# Patient Record
(demographics unavailable — no encounter records)

---

## 2024-12-12 NOTE — HISTORY OF PRESENT ILLNESS
[de-identified] : 68 yo F pmhx breast ca (s/p L- mastectomy, chemo and Tamoxifen course), HLD, osteopenia, chronic LBP, rosacea, glaucoma suspect seen today for CPE   spouse  24 -  in sleep of MI he was 68  -have 2 kids - supportive - she helps watch her grand son 2 x a week   increased co q 10 200 qd - no cramps since  does weights pilaties steps etc  retired 2021 caring for her Grand son and  mother 90 yr old, has mobility issues and lives alone   ball of foot gets numb when driving - concerned   24 will be getting left wrist cyst removed   Migraines/ headache - none since last visit    was in ER they has a Stroke code r/o stroke. 2023  DID CAT Angio Neck , head 23 -neg  MRI Brain - Ch infarct vs perivascular space in rt basal ganglia , no acute infarct  23 ERI echocardiogram.-showed PAVM -was advised to see cardio and  neurologist for an EEG. started 81 mg of aspirin and Lipitor 40 mg . she started Asa but did not start lipitor 40 due to poor muscle - could not tolerate medications in past  -saw cardio 10/9/23 did Holter Monitor -normal - on asa and atorvastatin  -saw neuro 23 - did EEG carotid and intracranial Doppler's neg   HX herniated disc in past 9 yrs ago -hurt her back and right thigh at gym doing certain exercises -9 yrs ago - recently she started to get cramps and pain right thigh and groin - slowly getting worse , now concerned as her driving is affected, groin pain when she reaches for break and her thigh cramps up - pain in hamstring sitting in chair - tingling in legs after long sitting or stanging, no numbness for last 3 months - numbness , no Cp, SOB , palpitation , no light headed or dizzy spells  hx word mixing, stress- states overall happening less, but still on-going and is more aware of it -reports stable stress level related to caring for elderly mother, helps that is now retired and doing Pilates -denies depression, anxiety, HI or SI -interested to see specialist -denies HA, vision trouble, dizziness, dysarthria, imblanace, falls or focal weakness  hx breast ca - left, dx'd 8/01, hx L- mastectomy 10/01, s/p chemo (x 6 mo ), s/p Tamoxifen (-)- in remission -hx BRCA negative per pt -followed by oncology- seen 2021 , advised genetics eval- pending. To f/u under survivorship - pending -reports mammo/sono 2022 -followed by breast surgeon, Dr. Siegle  hx osteopenia- hx DEXA 10/21 osteopenia -on Ca/D -exercising -denies hx bone fractures or falls.  HLD- no hx medications -has low fat diet -exercising: Pilates 3-4x/wk x 1 yr; walks 10k steps/d - done w/o sx's or limitations  hx rosacea- controlled on topical per derm, seen yearly

## 2024-12-12 NOTE — HEALTH RISK ASSESSMENT
[Good] : ~his/her~  mood as  good [No] : In the past 12 months have you used drugs other than those required for medical reasons? No [No falls in past year] : Patient reported no falls in the past year [0] : 2) Feeling down, depressed, or hopeless: Not at all (0) [PHQ-2 Negative - No further assessment needed] : PHQ-2 Negative - No further assessment needed [Never] : Never [Alone] : lives alone [Retired] : retired [] :  [Fully functional (bathing, dressing, toileting, transferring, walking, feeding)] : Fully functional (bathing, dressing, toileting, transferring, walking, feeding) [Fully functional (using the telephone, shopping, preparing meals, housekeeping, doing laundry, using] : Fully functional and needs no help or supervision to perform IADLs (using the telephone, shopping, preparing meals, housekeeping, doing laundry, using transportation, managing medications and managing finances) [Patient reported PAP Smear was normal] : Patient reported PAP Smear was normal [de-identified] : Pilatties weight  [[id number]] : 0 [Reports changes in hearing] : Reports no changes in hearing [Reports changes in vision] : Reports no changes in vision [Reports changes in dental health] : Reports no changes in dental health [PapSmearDate] : 11/2024

## 2024-12-12 NOTE — PHYSICAL EXAM
[No Acute Distress] : no acute distress [Well-Appearing] : well-appearing [Normal Sclera/Conjunctiva] : normal sclera/conjunctiva [PERRL] : pupils equal round and reactive to light [EOMI] : extraocular movements intact [Normal Outer Ear/Nose] : the outer ears and nose were normal in appearance [Normal Oropharynx] : the oropharynx was normal [No JVD] : no jugular venous distention [No Lymphadenopathy] : no lymphadenopathy [Supple] : supple [Thyroid Normal, No Nodules] : the thyroid was normal and there were no nodules present [No Respiratory Distress] : no respiratory distress  [No Accessory Muscle Use] : no accessory muscle use [Clear to Auscultation] : lungs were clear to auscultation bilaterally [Normal Rate] : normal rate  [Regular Rhythm] : with a regular rhythm [Normal S1, S2] : normal S1 and S2 [No Murmur] : no murmur heard [No Carotid Bruits] : no carotid bruits [Pedal Pulses Present] : the pedal pulses are present [No Edema] : there was no peripheral edema [No Palpable Aorta] : no palpable aorta [Soft] : abdomen soft [Non Tender] : non-tender [Non-distended] : non-distended [No Masses] : no abdominal mass palpated [No HSM] : no HSM [Normal Bowel Sounds] : normal bowel sounds [Normal Posterior Cervical Nodes] : no posterior cervical lymphadenopathy [Normal Anterior Cervical Nodes] : no anterior cervical lymphadenopathy [No CVA Tenderness] : no CVA  tenderness [No Spinal Tenderness] : no spinal tenderness [No Joint Swelling] : no joint swelling [Grossly Normal Strength/Tone] : grossly normal strength/tone [No Rash] : no rash [Coordination Grossly Intact] : coordination grossly intact [No Focal Deficits] : no focal deficits [Normal Gait] : normal gait [Deep Tendon Reflexes (DTR)] : deep tendon reflexes were 2+ and symmetric [Normal Affect] : the affect was normal [Normal Insight/Judgement] : insight and judgment were intact [de-identified] : left wrsit cyst

## 2024-12-12 NOTE — ASSESSMENT
[FreeTextEntry1] : left wrist  cyst  -saw hand sp scheduled for surgery 12/30/24  adv ice   hx New onset migraine with Aura vs Seizures Vs TIA-none since last visit 9/12/23has f/u 3/2025  -saw neuro 5/3/24 - did repeat trans cranial and carotids doppler - no change - has f/u 3/2025 to redo dopplers --saw cardio 10/9/23 did Holter Monitor -normal - on asa and atorvastatin -DID CAT Angio Neck , head 9/6/23 -neg -MRI Brain - Ch infarct vs perivascular space in rt basal ganglia , no acute infarct 9/6/23 ERI echocardiogram.-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 EKG NSR at 63 bpm no change 2022   hx chronic LBP- Not too bothersome -hx DDD by MRI in 2013 -hx pain injections (last 2013) by neurosx -hx PT -no pain meds taken as tolerable  hx word mixing- concurrent with increased stress level, stable. Neuro exam unremarkable.- better than last year -hx nl MMSE  -saw neuro - 9/21/23 w/u neg as per pt  HLD- -started atorvastatin 10 qd 9/2023 -advised low fat diet and exercise -check lipids  hx breast ca - left, dx'd 8/01, hx L- mastectomy 10/01, s/p chemo (x 6 mo 2001) - in remission, asx -s/p Tamoxifen (5/02-5/07) -followed by oncology- genetics eval- neg. Did BRCA neg under survivorship -reports mammo/sono 12/2023 , rec: repeat right dx'd mammo/sono, -followed by breast surgeon, Dr. Siegle  hx osteopenia- denies hx bone fractures or falls. -hx DEXA 11/2022 per GYN, told stable -on Ca/D -exercise encouraged  hx glaucoma suspect- -followed by optho, 2/2024   hx rosacea- controlled -on topical per derm -yearly skin cancer screening with derm advised. Regular use of sun block for skin cancer prevention advised.  HCM dermatology - skin check 7/2024 Mammo- 12/21/23 Bi rad 2 followed by Dr. Dowell breast surgeon-has appt 12/2024  Prevnar 20 -11/30/22 PAP- 11/2024  neg -9/18 hep C screening negative -hx flu shot 10/2024  -hx Tdap 5/16 -hx MMR booster (completed 1/20) -hx shingrix vaccine series-2018 colonoscopy-11/2024 4 polyps removed  (Dr. Lynch).due 2027- yearly dental screening advised Pfizer vaccine - completed 3/2021 - booster 11/8/21 , 9/30/22, 10/3/23, 10/2024  Dexa 11/2022 - osteopenia  Pt's best contact, home: [phone]

## 2025-03-21 NOTE — DISCUSSION/SUMMARY
[FreeTextEntry1] : 67-year-old woman who is here for initial consultation of numbness in the right toe that would spread to the top of the ankle on the right side only.  The differential diagnosis includes lumbar radiculopathy especially in the L5-S1 region.  Other considerations include vascular insufficiency due to the removal of the right saphenous vein and varicose vein.  Patient will follow-up with me after the MRI of the lumbar spine is completed.  I spent the time noted on the day of this patient encounter preparing for, review of medical records,review of pertinent diagnostic studies, providing and documenting the above E/M service and counseling and educate patient on differential, workup, disease course, and treatment/management. Education was provided to the patient during this encounter. All questions and concerns were answered and addressed in detail. The patient verbalized understanding and agreed to plan. Patient was advised to continue to monitor for neurologic symptoms and to notify my office or go to the nearest emergency room if there are any changes. Any orders/referrals and communications were provided as well. Side effects of the above medications were discussed in detail including but not limited to applicable black box warning and teratogenicity as appropriate. Patient was advised to bring previous records to my office. A copy of the consult note will be sent to the referring physician.

## 2025-03-21 NOTE — PHYSICAL EXAM
[FreeTextEntry1] : Constitutional: alert. Psychiatric: oriented to person, place, and time. Neurologic:  Orientation: oriented to person, oriented to place and oriented to time.  Memory: short term memory intact.  Language: fluency intact.  Fund of knowledge: displays adequate knowledge of current events.  Cranial Nerves: visual acuity intact bilaterally, visual fields full to confrontation, pupils equal round and reactive to light, extraocular motion intact, facial sensation intact symmetrically, face symmetrical, hearing was intact bilaterally, head turning and shoulder shrug symmetric and there was no tongue deviation with protrusion.  Motor: muscle tone was normal in all four extremities and muscle strength was normal in all four extremities.  Sensory exam: light touch was intact.  Coordination:. Finger to nose dysmetria was not present.  normal gait Deep tendon reflexes: Biceps right 1+. Biceps left 1+. Patella right 1+. Patella left 1+. .  On exam patient's right ankle  is more edematous and has more prominent veins than the left side.

## 2025-03-21 NOTE — HISTORY OF PRESENT ILLNESS
[FreeTextEntry1] : 67-year-old woman who is here for initial consultation of numbness in the right toe that would spread to the top of the ankle on the right side only.  Patient experiences this when she is driving.  Patient used to have foot cramp with eversion of the foot but she has avoided inversion and therefore she is asymptomatic now.  Patient had an MRI of the lumbar spine that showed L4-5 bulge in the scan in 2013.  On exam patient's right ankle  is more edematous and has more prominent veins than the left side.  Patient had a right saphenous vein removal and has been diagnosed with varicose veins by her vascular specialist.

## 2025-05-02 NOTE — PHYSICAL EXAM
[de-identified] : Examination of the lumbar spine reveals no midline tenderness palpation, step-offs, or skin lesions. Decreased range of motion with respect to flexion, extension, lateral bending, and rotation. No tenderness to palpation of the sciatic notch. No tenderness palpation of the bilateral greater trochanters. No pain with passive internal/external rotation of the hips. No instability of bilateral lower extremities.  Negative ALANNAH. Negative straight leg raise bilaterally. No bowstring. Negative femoral stretch. 5 out of 5 iliopsoas, hip abductors, hips adductors, quadriceps, hamstrings, gastrocsoleus, tibialis anterior, extensor hallucis longus, peroneals. Grossly intact sensation to light touch bilateral lower extremities. 1+ patellar and Achilles reflexes. Downgoing Babinski. No clonus. Intact proprioception. Palpable pulses. No skin lesion and no edema on the right and left lower extremities. [de-identified] : Lumbar MRI report does report some stenosis and a small disc protrusion at L4-5 creating lateral recess and foraminal stenosis

## 2025-05-02 NOTE — HISTORY OF PRESENT ILLNESS
[de-identified] : Ms. KAYE NEWELL  is a 67 year old female who presents with left foot/ankle numbness for about a year that is not improving.  She only gets numbness when she is driving or sitting.  Denies any LE radicular symptoms.  Normal bowel and bladder control.   Denies any recent fevers, chills, sweats, weight loss, or infection.  The patients past medical history, past surgical history, medications, allergies, and social history were reviewed by me today with the patient and documented accordingly.  In addition, the patient's family history, which is noncontributory to their visit, was also reviewed.

## 2025-05-02 NOTE — DISCUSSION/SUMMARY
[de-identified] : We discussed nonsurgical and surgical options.  This point she wishes to continue with conservative measures.  She will try course of physical therapy.  Follow-up afterwards.

## 2025-05-12 NOTE — ASSESSMENT
[FreeTextEntry1] : Knee pain b/l - ? OA  keep appt with ortho and PT  adv warm compress avoid squatting or sitting on floor - Trial of diclofenac gel as needed   left wrist  cyst -dx ganglionic cysts surgery 12/30/24  -saw hand sp  adv ice   hx New onset migraine with Aura vs Seizures Vs TIA-none since last visit 9/12/23has f/u 3/2025  -saw neuro 5/3/24 - did repeat trans cranial and carotids doppler - no change - has f/u 3/2025 to redo dopplers --saw cardio 10/9/23 did Holter Monitor -normal - on asa and atorvastatin -DID CAT Angio Neck , head 9/6/23 -neg -MRI Brain - Ch infarct vs perivascular space in rt basal ganglia , no acute infarct 9/6/23 ERI echocardiogram.-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 EKG NSR at 63 bpm no change 2022   hx chronic LBP- Not too bothersome -hx DDD by MRI in 2013 -hx pain injections (last 2013) by neurosx -hx PT -no pain meds taken as tolerable  hx word mixing- concurrent with increased stress level, stable. Neuro exam unremarkable.- better than last year -hx nl MMSE  -saw neuro - 9/21/23 w/u neg as per pt  HLD- -started atorvastatin 10 qd 9/2023 -advised low fat diet and exercise -check lipids  hx breast ca - left, dx'd 8/01, hx L- mastectomy 10/01, s/p chemo (x 6 mo 2001) - in remission, asx -s/p Tamoxifen (5/02-5/07) -followed by oncology- genetics eval- neg. Did BRCA neg under survivorship -reports mammo/sono 12/2023 , rec: repeat right dx'd mammo/sono, -followed by breast surgeon, Dr. Siegle  hx osteopenia- denies hx bone fractures or falls. -hx DEXA 11/2022 per GYN, told stable -on Ca/D -exercise encouraged  hx glaucoma suspect- -followed by optho, 2/2024   hx rosacea- controlled -on topical per derm -yearly skin cancer screening with derm advised. Regular use of sun block for skin cancer prevention advised.  HCM dermatology - skin check 7/2024 Mammo- 12/21/23 Bi rad 2 followed by Dr. Doewll breast surgeon-has appt 12/2024  Prevnar 20 -11/30/22 PAP- 11/2024  neg -9/18 hep C screening negative -hx flu shot 10/2024  -hx Tdap 5/16 -hx MMR booster (completed 1/20) -hx shingrix vaccine series-2018 colonoscopy-11/2024 4 polyps removed  (Dr. Lynch).due 2027- yearly dental screening advised Pfizer vaccine - completed 3/2021 - booster 11/8/21 , 9/30/22, 10/3/23, 10/2024  Dexa 11/2022 - osteopenia  Pt's best contact, home: [phone]

## 2025-05-12 NOTE — HISTORY OF PRESENT ILLNESS
[Home] : at home, [unfilled] , at the time of the visit. [Medical Office: ([facility] [place])___] : at the medical office located in  [Telehealth (audio & video)] : This visit was provided via telehealth using real-time 2-way audio visual technology. [Verbal consent obtained from patient] : the patient, [unfilled] [de-identified] : 66 yo F pmhx breast ca (s/p L- mastectomy, chemo and Tamoxifen course), HLD, osteopenia, chronic LBP, rosacea, glaucoma suspect seen today for f/u on ch issues   knee pain b/l Left > right when walking  - hurts - cannot squat or bed - pain at night , no swelling  taking co q 10 and atorvastatin  -has appt with Ortho  this Friday -  -will be doing PT wed   spouse  24 -  in sleep of MI he was 68  -have 2 kids - supportive - she helps watch her grand son 2 x a week   increased co q 10 200 qd - no cramps since  does weights pilaties steps etc  retired 2021 caring for her Grand son and  mother 90 yr old, has mobility issues and lives alone   ball of foot gets numb when driving - concerned   24 - sx ganglionic cyst   Migraines/ headache - none since last visit    was in ER they has a Stroke code r/o stroke. 2023  DID CAT Angio Neck , head 23 -neg  MRI Brain - Ch infarct vs perivascular space in rt basal ganglia , no acute infarct  23 ERI echocardiogram.-showed PAVM -was advised to see cardio and  neurologist for an EEG. started 81 mg of aspirin and Lipitor 40 mg . she started Asa but did not start lipitor 40 due to poor muscle - could not tolerate medications in past  -saw cardio 10/9/23 did Holter Monitor -normal - on asa and atorvastatin  -saw neuro 23 - did EEG carotid and intracranial Doppler's neg   HX herniated disc in past 9 yrs ago -hurt her back and right thigh at gym doing certain exercises -9 yrs ago - recently she started to get cramps and pain right thigh and groin - slowly getting worse , now concerned as her driving is affected, groin pain when she reaches for break and her thigh cramps up - pain in hamstring sitting in chair - tingling in legs after long sitting or stanging, no numbness for last 3 months - numbness , no Cp, SOB , palpitation , no light headed or dizzy spells  hx word mixing, stress- states overall happening less, but still on-going and is more aware of it -reports stable stress level related to caring for elderly mother, helps that is now retired and doing Pilates -denies depression, anxiety, HI or SI -interested to see specialist -denies HA, vision trouble, dizziness, dysarthria, imblanace, falls or focal weakness  hx breast ca - left, dx'd , hx L- mastectomy 10/01, s/p chemo (x 6 mo ), s/p Tamoxifen (-)- in remission -hx BRCA negative per pt -followed by oncology- seen 2021 , advised genetics eval- pending. To f/u under survivorship - pending -reports mammo/sono 2022 -followed by breast surgeon, Dr. Siegle  hx osteopenia- hx DEXA 10/21 osteopenia -on Ca/D -exercising -denies hx bone fractures or falls.  HLD- no hx medications -has low fat diet -exercising: Pilates 3-4x/wk x 1 yr; walks 10k steps/d - done w/o sx's or limitations  hx rosacea- controlled on topical per derm, seen yearly

## 2025-05-16 NOTE — PHYSICAL EXAM
[de-identified] : Constitutional: Well-nourished, well-developed, No acute distress Respiratory:  Good respiratory effort, no SOB Lymphatic: No regional lymphadenopathy, no lymphedema Psychiatric: Pleasant and normal affect, alert and oriented x3 Skin: Clean dry and intact B/L LE Musculoskeletal: normal except where as noted in regional exam  Left Knee: APPEARANCE: no marked deformities, no swelling or malalignment POSITIVE TENDERNESS:  + crepitus of the anterior knee, and tenderness of patellar retinaculum NONTENDER: jt lines b/l, patellar & quadriceps tendons, MCL/LCL, ITB at the lateral femoral condyle & Gerdy's tubercle, pes bursa.  ROM: full & painless, although some discomfort in deep knee flexion RESISTIVE TESTING: + discomfort with knee ext from deep knee flexion (stretched position), painless knee flexion.  SPECIAL TESTS: stable v/v stress. painless grind. neg Lachman's. neg ant/post drawer. neg Aditya's.   Right Knee: APPEARANCE: no marked deformities, no swelling or malalignment POSITIVE TENDERNESS:  + crepitus of the anterior knee, and tenderness of patellar retinaculum NONTENDER: jt lines b/l, patellar & quadriceps tendons, MCL/LCL, ITB at the lateral femoral condyle & Gerdy's tubercle, pes bursa.  ROM: full & painless, although some discomfort in deep knee flexion RESISTIVE TESTING: + discomfort with knee ext from deep knee flexion (stretched position), painless knee flexion.  SPECIAL TESTS: stable v/v stress. painless grind. neg Lachman's. neg ant/post drawer. neg Aditya's.   [de-identified] : The following radiographs were ordered and read by me during this patient's visit. I reviewed each radiograph in detail with the patient and discussed the findings as highlighted below.   3 views of the bilateral knees were obtained today that show degenerative changes and evidence of mild to moderate PF and medial compartment osteoarthritis.  Patient has slight medial joint line narrowing on the left knee more so than the right.  No fracture, or dislocation seen at this time. There is no malalignment. No other obvious osseous abnormality. Otherwise unremarkable.

## 2025-05-16 NOTE — DISCUSSION/SUMMARY
[de-identified] : Discussed findings of today's exam and possible causes of patient's pain.  Educated patient on their most probable diagnosis of bilateral mild to moderate knee osteoarthritis, underlying etiology of weak proximal hip musculature and poor hip stability.  Reviewed possible courses of treatment, and we collaboratively decided best course of treatment at this time will include conservative management.  Patient will be started on a course of physical therapy to restore normal range of motion and strength as tolerated.  I educated the patient on the biomechanical exam that has contributed to development of knee pain.  I also advised the patient on the importance of maintaining a home exercise program and the goal of physical therapy is to teach this home exercise program for maintenance of hip strengthening exercises to provide proximal stability and decrease reactive forces at the patellofemoral compartment.  If patient has persisting pain may consider cortisone and/or hyaluronic acid injections as future treatment options.  Follow up as needed.  Patient appreciates and agrees with current plan.   This note was generated using dragon medical dictation software.  A reasonable effort has been made for proofreading its contents, but typos may still remain.  If there are any questions or points of clarification needed please notify my office.

## 2025-05-16 NOTE — HISTORY OF PRESENT ILLNESS
[de-identified] : 67 year old retired female presents for evaluation of bilateral knee pain Right > Left for the past 1 month. She denies injury. She states the pain has been worsened with walking, stairs and exercises in yoga and Pilates. She has difficulty bending her Left knee completely as she feels there is a lump in the posterior aspect of her knee with full flexion. She states the pain is otherwise mostly of the medial aspects of both knees. She has used Aleve on occasion with some relief.  Denies significant medical issues.

## 2025-06-13 NOTE — ASSESSMENT
[FreeTextEntry1] : Knee pain b/l - ? OA  saw ortho doing PT  adv warm compress avoid squatting or sitting on floor - Trial of diclofenac gel as needed   left wrist  cyst -dx ganglionic cysts surgery 12/30/24  -saw hand sp  adv ice   hx New onset migraine with Aura vs Seizures Vs TIA-none since last visit 9/12/23, 3/2025  -saw neuro 5/3/24 - did repeat trans cranial and carotids doppler - no change - has f/u 3/2025 to redo dopplers --saw cardio 10/9/23 did Holter Monitor -normal - on asa and atorvastatin -DID CAT Angio Neck , head 9/6/23 -neg -MRI Brain - Ch infarct vs perivascular space in rt basal ganglia , no acute infarct 9/6/23 ERI echocardiogram.-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 EKG NSR at 63 bpm no change 2022   hx chronic LBP- Not too bothersome MRI 3/2025 L spine  -hx DDD by MRI in 2013 -hx pain injections (last 2013) by neurosx -hx PT -no pain meds taken as tolerable  hx word mixing- concurrent with increased stress level, stable. Neuro exam unremarkable.- better than last year -hx nl MMSE  -saw neuro - 9/21/23 w/u neg as per pt  HLD-LDL- 64 12/24  -started atorvastatin 10 qd 9/2023 -advised low fat diet and exercise -check lipids  hx breast ca - left, dx'd 8/01, hx L- mastectomy 10/01, s/p chemo (x 6 mo 2001) - in remission, asx -s/p Tamoxifen (5/02-5/07) -followed by oncology- genetics eval- neg. Did BRCA neg under survivorship -reports mammo/sono 12/2023 , rec: repeat right dx'd mammo/sono, -followed by breast surgeon, Dr. Siegle  hx osteopenia- denies hx bone fractures or falls. -hx DEXA 11/2022 per GYN, told stable -on Ca/D -exercise encouraged  hx glaucoma suspect- -followed by optho, 2/2024   hx rosacea- controlled -on topical per derm -yearly skin cancer screening with derm advised. Regular use of sun block for skin cancer prevention advised.  [facility] dermatology - skin check 7/2024 Mammo- 12/24 Bi rad 2 followed by Dr. Dowell breast surgeon-has appt 12/2024  Prevnar 20 -11/30/22 PAP- 11/2024  neg -9/18 hep C screening negative -hx flu shot 10/2024  -hx Tdap 5/16 -hx MMR booster (completed 1/20) -hx shingrix vaccine series-2018 colonoscopy-11/2024 4 polyps removed  (Dr. Lynch).due 2027- yearly dental screening advised Pfizer vaccine - completed 3/2021 - booster 11/8/21 , 9/30/22, 10/3/23, 10/2024  Dexa 2/2025 - osteopenia  Pt's best contact, home: [phone]

## 2025-06-13 NOTE — HISTORY OF PRESENT ILLNESS
[de-identified] : 68 yo F pmhx breast ca (s/p L- mastectomy, chemo and Tamoxifen course), HLD, osteopenia, chronic LBP, rosacea, glaucoma suspect seen today for f/u on ch issues   had one episode of migraine since 2024  no complaints today   knee pain b/l Left > right when walking  - hurts - cannot squat or bed - pain at night , no swelling  taking co q 10 and atorvastatin  -saw Ortho adv PT  --doing PT wed   spouse  24 -  in sleep of MI he was 68  -have 2 kids - supportive - she helps watch her grand son 2 x a week   increased co q 10 200 qd - no cramps since  does weights pilaties steps etc  retired 2021 caring for her Grand son and  mother 90 yr old, has mobility issues and lives alone   ball of foot gets numb when driving - concerned -saw ortho -due to pinched nerver in L spine -adv doing PT for foot   24 - sx ganglionic cyst   Migraines/ headache - none since last visit    was in ER they has a Stroke code r/o stroke. 2023  DID CAT Angio Neck , head 23 -neg  MRI Brain - Ch infarct vs perivascular space in rt basal ganglia , no acute infarct  23 ERI echocardiogram.-showed PAVM -was advised to see cardio and  neurologist for an EEG. started 81 mg of aspirin and Lipitor 40 mg . she started Asa but did not start lipitor 40 due to poor muscle - could not tolerate medications in past  -saw cardio 10/9/23 did Holter Monitor -normal - on asa and atorvastatin  -saw neuro 23 - did EEG carotid and intracranial Doppler's neg   HX herniated disc in past 9 yrs ago -hurt her back and right thigh at gym doing certain exercises -9 yrs ago - recently she started to get cramps and pain right thigh and groin - slowly getting worse , now concerned as her driving is affected, groin pain when she reaches for break and her thigh cramps up - pain in hamstring sitting in chair - tingling in legs after long sitting or stanging, no numbness for last 3 months - numbness , no Cp, SOB , palpitation , no light headed or dizzy spells MRI L spine 3/2025   hx word mixing, stress- states overall happening less, but still on-going and is more aware of it -reports stable stress level related to caring for elderly mother, helps that is now retired and doing Pilates -denies depression, anxiety, HI or SI -interested to see specialist -denies HA, vision trouble, dizziness, dysarthria, imblanace, falls or focal weakness  hx breast ca - left, dx'd , hx L- mastectomy 10/01, s/p chemo (x 6 mo ), s/p Tamoxifen (-)- in remission -hx BRCA negative per pt -followed by oncology- seen 2021 , advised genetics eval- pending. To f/u under survivorship - pending -reports mammo/sono 2022 -followed by breast surgeon, Dr. Siegle  hx osteopenia- hx DEXA 10/21 osteopenia -on Ca/D -exercising -denies hx bone fractures or falls.  HLD- no hx medications -has low fat diet -exercising: Pilates 3-4x/wk x 1 yr; walks 10k steps/d - done w/o sx's or limitations  hx rosacea- controlled on topical per derm, seen yearly

## 2025-07-25 NOTE — HISTORY OF PRESENT ILLNESS
[de-identified] : 68 yo F pmhx breast ca (s/p L- mastectomy, chemo and Tamoxifen course), HLD, osteopenia, chronic LBP, rosacea, glaucoma suspect seen today for f/u on ch issues   -  was a hot humid day, and did too much yard work. In the afternoon  felt dehydrated, and the feeling of doom. Called 911 because she was feeling some chest tightness.  They took me to [facility].  Na was 119 and my systolic b/p was elevated . was discharged with instructions to f/u with Primary care and a cardiologist. she feels much better this week ,no weakness  monitoring bp at home - readings normal 114-130/70-85   knee pain b/l Left > right when walking  - hurts - cannot squat or bed - pain at night , no swelling  taking co q 10 and atorvastatin  -saw Ortho adv PT  --doing PT wed   spouse  24 -  in sleep of MI he was 68  -have 2 kids - supportive - she helps watch her grand son 2 x a week   increased co q 10 200 qd - no cramps since  does weights pilaties steps etc  retired 2021 caring for her Grand son and  mother 90 yr old, has mobility issues and lives alone   ball of foot gets numb when driving - concerned -saw ortho -due to pinched nerver in L spine -adv doing PT for foot   24 - sx ganglionic cyst   Migraines/ headache - none since last visit    was in ER they has a Stroke code r/o stroke. 2023  DID CAT Angio Neck , head 23 -neg  MRI Brain - Ch infarct vs perivascular space in rt basal ganglia , no acute infarct  23 ERI echocardiogram.-showed PAVM -was advised to see cardio and  neurologist for an EEG. started 81 mg of aspirin and Lipitor 40 mg . she started Asa but did not start lipitor 40 due to poor muscle - could not tolerate medications in past  -saw cardio 10/9/23 did Holter Monitor -normal - on asa and atorvastatin  -saw neuro 23 - did EEG carotid and intracranial Doppler's neg   HX herniated disc in past 9 yrs ago -hurt her back and right thigh at gym doing certain exercises -9 yrs ago - recently she started to get cramps and pain right thigh and groin - slowly getting worse , now concerned as her driving is affected, groin pain when she reaches for break and her thigh cramps up - pain in hamstring sitting in chair - tingling in legs after long sitting or stanging, no numbness for last 3 months - numbness , no Cp, SOB , palpitation , no light headed or dizzy spells MRI L spine 3/2025   hx word mixing, stress- states overall happening less, but still on-going and is more aware of it -reports stable stress level related to caring for elderly mother, helps that is now retired and doing Pilates -denies depression, anxiety, HI or SI -interested to see specialist -denies HA, vision trouble, dizziness, dysarthria, imblanace, falls or focal weakness  hx breast ca - left, dx'd , hx L- mastectomy 10/01, s/p chemo (x 6 mo ), s/p Tamoxifen (-)- in remission -hx BRCA negative per pt -followed by oncology- seen 2021 , advised genetics eval- pending. To f/u under survivorship - pending -reports mammo/sono 2022 -followed by breast surgeon, Dr. Siegle  hx osteopenia- hx DEXA 10/21 osteopenia -on Ca/D -exercising -denies hx bone fractures or falls.  HLD- no hx medications -has low fat diet -exercising: Pilates 3-4x/wk x 1 yr; walks 10k steps/d - done w/o sx's or limitations  hx rosacea- controlled on topical per derm, seen yearly

## 2025-07-25 NOTE — ASSESSMENT
[FreeTextEntry1] : Hyponatremia Na - 119   Knee pain b/l - ? OA  saw ortho doing PT  adv warm compress avoid squatting or sitting on floor - Trial of diclofenac gel as needed   left wrist  cyst -dx ganglionic cysts surgery 12/30/24  -saw hand sp  adv ice   hx New onset migraine with Aura vs Seizures Vs TIA-none since last visit 9/12/23, 3/2025  -saw neuro 5/3/24 - did repeat trans cranial and carotids doppler - no change - has f/u 3/2025 to redo dopplers --saw cardio 10/9/23 did Holter Monitor -normal - on asa and atorvastatin -DID CAT Angio Neck , head 9/6/23 -neg -MRI Brain - Ch infarct vs perivascular space in rt basal ganglia , no acute infarct 9/6/23 ERI echocardiogram.-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 EKG NSR at 63 bpm no change 2022   hx chronic LBP- Not too bothersome MRI 3/2025 L spine  -hx DDD by MRI in 2013 -hx pain injections (last 2013) by neurosx -hx PT -no pain meds taken as tolerable  hx word mixing- concurrent with increased stress level, stable. Neuro exam unremarkable.- better than last year -hx nl MMSE  -saw neuro - 9/21/23 w/u neg as per pt  HLD-LDL- 64 12/24  -started atorvastatin 10 qd 9/2023 -advised low fat diet and exercise -check lipids  hx breast ca - left, dx'd 8/01, hx L- mastectomy 10/01, s/p chemo (x 6 mo 2001) - in remission, asx -s/p Tamoxifen (5/02-5/07) -followed by oncology- genetics eval- neg. Did BRCA neg under survivorship -reports mammo/sono 12/2023 , rec: repeat right dx'd mammo/sono, -followed by breast surgeon, Dr. Siegle  hx osteopenia- denies hx bone fractures or falls. -hx DEXA 11/2022 per GYN, told stable -on Ca/D -exercise encouraged  hx glaucoma suspect- -followed by optho, 2/2024   hx rosacea- controlled -on topical per derm -yearly skin cancer screening with derm advised. Regular use of sun block for skin cancer prevention advised.  [facility] dermatology - skin check 7/2024 Mammo- 12/24 Bi rad 2 followed by Dr. Dowell breast surgeon-has appt 12/2024  Prevnar 20 -11/30/22 PAP- 11/2024  neg -9/18 hep C screening negative -hx flu shot 10/2024  -hx Tdap 5/16 -hx MMR booster (completed 1/20) -hx shingrix vaccine series-2018 colonoscopy-11/2024 4 polyps removed  (Dr. Lynch).due 2027- yearly dental screening advised Pfizer vaccine - completed 3/2021 - booster 11/8/21 , 9/30/22, 10/3/23, 10/2024  Dexa 2/2025 - osteopenia  Pt's best contact, home: [phone]